# Patient Record
Sex: FEMALE | Race: BLACK OR AFRICAN AMERICAN | NOT HISPANIC OR LATINO
[De-identification: names, ages, dates, MRNs, and addresses within clinical notes are randomized per-mention and may not be internally consistent; named-entity substitution may affect disease eponyms.]

---

## 2018-01-23 ENCOUNTER — RESULT REVIEW (OUTPATIENT)
Age: 43
End: 2018-01-23

## 2018-04-24 ENCOUNTER — APPOINTMENT (OUTPATIENT)
Dept: MRI IMAGING | Facility: CLINIC | Age: 43
End: 2018-04-24

## 2018-05-31 ENCOUNTER — TRANSCRIPTION ENCOUNTER (OUTPATIENT)
Age: 43
End: 2018-05-31

## 2018-06-12 ENCOUNTER — APPOINTMENT (OUTPATIENT)
Dept: MRI IMAGING | Facility: CLINIC | Age: 43
End: 2018-06-12

## 2018-06-24 ENCOUNTER — TRANSCRIPTION ENCOUNTER (OUTPATIENT)
Age: 43
End: 2018-06-24

## 2018-07-15 ENCOUNTER — EMERGENCY (EMERGENCY)
Facility: HOSPITAL | Age: 43
LOS: 1 days | Discharge: HOME | End: 2018-07-15
Attending: EMERGENCY MEDICINE | Admitting: EMERGENCY MEDICINE
Payer: MEDICAID

## 2018-07-15 VITALS
SYSTOLIC BLOOD PRESSURE: 138 MMHG | DIASTOLIC BLOOD PRESSURE: 88 MMHG | OXYGEN SATURATION: 100 % | HEART RATE: 99 BPM | TEMPERATURE: 99 F | RESPIRATION RATE: 17 BRPM

## 2018-07-15 VITALS
OXYGEN SATURATION: 98 % | DIASTOLIC BLOOD PRESSURE: 65 MMHG | HEART RATE: 82 BPM | SYSTOLIC BLOOD PRESSURE: 124 MMHG | RESPIRATION RATE: 16 BRPM

## 2018-07-15 PROCEDURE — 93010 ELECTROCARDIOGRAM REPORT: CPT

## 2018-07-15 PROCEDURE — 71046 X-RAY EXAM CHEST 2 VIEWS: CPT | Mod: 26

## 2018-07-15 PROCEDURE — 99284 EMERGENCY DEPT VISIT MOD MDM: CPT | Mod: 25

## 2018-07-15 NOTE — ED PROVIDER NOTE - GENITOURINARY, MLM
No discharge, lesions. Chaperone: Noemi Pardo RN No discharge, lesions. Chaperone: Noemi Pardo RN no swelling.

## 2018-07-15 NOTE — ED PROVIDER NOTE - MEDICAL DECISION MAKING DETAILS
43F presenting with intermittent sob x1 month with labs and physical with PMD unremarkable. Patient PERC negative. No risk factors for ACS. Patient very well appearing, no focal findings on exam.

## 2018-07-15 NOTE — ED ADULT TRIAGE NOTE - CHIEF COMPLAINT QUOTE
Pt arrives to ED c/o SOB starting a few weeks ago.  Pt reports during the day she will have to take a deep breath to catch her breath.  Pt reports LPM was June 8th and is late for this month.  Pt reports having a yeast infection and taking Monistat a few days ago.  Pt not on birth control, has not been on an extended trip.  Pt not in respiratory distress in triage, able to speak in complete sentences.  Pt denies hx of asthma, does not smoke.  Pt denies CP. Pt arrives to ED c/o SOB starting a few weeks ago.  Pt reports during the day she will have to take a deep breath to catch her breath.  Pt reports LPM was June 8th and is late for this month.  Pt reports having a yeast infection and taking Monistat a few days ago.  Pt not on birth control, has not been on an extended trip.  Pt not in respiratory distress in triage, able to speak in complete sentences.  Pt denies hx of asthma, does not smoke.  Pt denies CP.  Update: 03:15 - Pt reports chest tightness.  EKG initiated in triage.  Charge RN notified.

## 2018-07-15 NOTE — ED PROVIDER NOTE - OBJECTIVE STATEMENT
43F with no pmh presenting with intermittent sob episodes x3-4 weeks. Patient states occasionally with no obvious inciting factor, patient needs to stop and catch her breath. Sometimes it is while talking on the phone, sometimes it is walking, sometimes at rest. Lasts for a few seconds and she is feeling better. States tonight she was going to sleep and felt symptoms arise. Endorses she saw her PMD about 1 month ago near onset of symptoms with full physical and routine labs unremarkable. Patient also complaining of swelling around vaginal area, currently being treated for yeast infection. Also states she may be late for her period which she never is with home pregnancy test negative. Denies fever, chills, cp, n/v/d, dizziness, headache, dysuria, recent illness, travel, sick contacts 43F with no pmh presenting with intermittent sob episodes x3-4 weeks. Patient states occasionally with no obvious inciting factor, patient needs to stop and catch her breath. Sometimes it is while talking on the phone, sometimes it is walking, sometimes at rest. Lasts for a few seconds and she is feeling better. States tonight she was going to sleep and felt symptoms arise. Endorses she saw her PMD about 1 month ago near onset of symptoms with full physical and routine labs unremarkable. Patient also complaining of swelling around vaginal area, currently being treated for yeast infection. Also states she may be late for her period which she never is with home pregnancy test negative. Denies fever, chills, cp, n/v/d, dizziness, headache, dysuria, recent illness, travel, sick contacts.   [place]: 43F no PMH p/w SOB. For ~4w pt has episodes where she feels like she needs to take deep breath, she then takes deep breath and symptoms resolve. Intermittent, several episodes per day. Not worse w/ exertion. Today also had very localized R cp w/ certain positions so came to ED. Denies NVD, lightheaded, diaphoresis, palpitations, cough/rhinorrhea, black/bloody stool, LE pain/swelling, focal weakness/numbness, recent travel/immobilization, abd pain, urinary complaints, f/c. No hormone use. No FMH CAD/clots. No prior cardiac w/u. Currently asymptomatic.

## 2018-07-15 NOTE — ED PROVIDER NOTE - ATTENDING CONTRIBUTION TO CARE
43F no PMH p/w SOB. For ~4w pt has episodes where she feels like she needs to take deep breath, she then takes deep breath and symptoms resolve. Intermittent, several episodes per day. Not worse w/ exertion. Today also had very localized R cp w/ certain positions so came to ED. Borderline tachycardia now self resolved, other vitals wnl, exam as above. EKG grossly wnl. CXR wnl.  ddx: Unclear etiology. clinically not ACS, PE, tamponade, dissection, PTX, perf, myocarditis, mediastinitis.   Comfortable for dc, outpt pmd f/u.  Pt also c/o possible vaginal swelling while in certain positions. ucg neg. Benign pelvic --> outpt gyn f/u.

## 2018-08-02 ENCOUNTER — OUTPATIENT (OUTPATIENT)
Dept: OUTPATIENT SERVICES | Facility: HOSPITAL | Age: 43
LOS: 1 days | End: 2018-08-02
Payer: COMMERCIAL

## 2018-08-02 ENCOUNTER — APPOINTMENT (OUTPATIENT)
Dept: MRI IMAGING | Facility: IMAGING CENTER | Age: 43
End: 2018-08-02
Payer: MEDICAID

## 2018-08-02 DIAGNOSIS — R10.2 PELVIC AND PERINEAL PAIN: ICD-10-CM

## 2018-08-02 PROCEDURE — 72197 MRI PELVIS W/O & W/DYE: CPT

## 2018-08-02 PROCEDURE — A9585: CPT

## 2018-08-02 PROCEDURE — 72197 MRI PELVIS W/O & W/DYE: CPT | Mod: 26

## 2018-09-04 ENCOUNTER — APPOINTMENT (OUTPATIENT)
Dept: INTERVENTIONAL RADIOLOGY/VASCULAR | Facility: CLINIC | Age: 43
End: 2018-09-04

## 2019-10-26 ENCOUNTER — TRANSCRIPTION ENCOUNTER (OUTPATIENT)
Age: 44
End: 2019-10-26

## 2020-02-24 ENCOUNTER — EMERGENCY (EMERGENCY)
Facility: HOSPITAL | Age: 45
LOS: 1 days | Discharge: HOME | End: 2020-02-24
Attending: STUDENT IN AN ORGANIZED HEALTH CARE EDUCATION/TRAINING PROGRAM | Admitting: STUDENT IN AN ORGANIZED HEALTH CARE EDUCATION/TRAINING PROGRAM
Payer: MEDICAID

## 2020-02-24 VITALS
HEART RATE: 92 BPM | DIASTOLIC BLOOD PRESSURE: 83 MMHG | TEMPERATURE: 98 F | RESPIRATION RATE: 16 BRPM | OXYGEN SATURATION: 100 % | SYSTOLIC BLOOD PRESSURE: 130 MMHG

## 2020-02-24 PROCEDURE — 99283 EMERGENCY DEPT VISIT LOW MDM: CPT

## 2020-02-24 NOTE — ED ADULT TRIAGE NOTE - CHIEF COMPLAINT QUOTE
pt c/o R Lower abdominal pain/cramping x1 week. pt finished menstrual period on Sat 2/22, however still having pain which is abnormal for her. pt denies N/V. denies PMH/Medications.

## 2020-02-25 LAB
APPEARANCE UR: CLEAR — SIGNIFICANT CHANGE UP
BACTERIA # UR AUTO: SIGNIFICANT CHANGE UP
BILIRUB UR-MCNC: NEGATIVE — SIGNIFICANT CHANGE UP
BLOOD UR QL VISUAL: NEGATIVE — SIGNIFICANT CHANGE UP
COLOR SPEC: YELLOW — SIGNIFICANT CHANGE UP
GLUCOSE UR-MCNC: NEGATIVE — SIGNIFICANT CHANGE UP
HYALINE CASTS # UR AUTO: NEGATIVE — SIGNIFICANT CHANGE UP
KETONES UR-MCNC: SIGNIFICANT CHANGE UP
LEUKOCYTE ESTERASE UR-ACNC: NEGATIVE — SIGNIFICANT CHANGE UP
NITRITE UR-MCNC: NEGATIVE — SIGNIFICANT CHANGE UP
PH UR: 7 — SIGNIFICANT CHANGE UP (ref 5–8)
PROT UR-MCNC: 30 — SIGNIFICANT CHANGE UP
RBC CASTS # UR COMP ASSIST: SIGNIFICANT CHANGE UP (ref 0–?)
SP GR SPEC: 1.04 — SIGNIFICANT CHANGE UP (ref 1–1.04)
SPECIMEN SOURCE: SIGNIFICANT CHANGE UP
SQUAMOUS # UR AUTO: SIGNIFICANT CHANGE UP
UROBILINOGEN FLD QL: NORMAL — SIGNIFICANT CHANGE UP
WBC UR QL: SIGNIFICANT CHANGE UP (ref 0–?)

## 2020-02-25 RX ORDER — IBUPROFEN 200 MG
600 TABLET ORAL ONCE
Refills: 0 | Status: COMPLETED | OUTPATIENT
Start: 2020-02-25 | End: 2020-02-25

## 2020-02-25 RX ADMIN — Medication 600 MILLIGRAM(S): at 01:16

## 2020-02-25 NOTE — ED PROVIDER NOTE - NSFOLLOWUPINSTRUCTIONS_ED_ALL_ED_FT
Please follow up with your OBGYN. Our clinic information has also been provided below  Continue motrin/advil every 6 hours as needed for pain. You can also take tylenol every 6 hours as needed for pain if not controlled with NSAIDs alone  Return to ED with any new/worsening symptoms, including but not limited to worsening pain, vaginal discharge or foul smells, or anything else concerning to you. Please follow up with your OBGYN. You can also call [phone] to schedule an appointment with our clinic.  Continue motrin/advil every 6 hours as needed for pain. You can also take tylenol every 6 hours as needed for pain if not controlled with NSAIDs alone  Return to ED with any new/worsening symptoms, including but not limited to worsening pain, vaginal discharge or foul smells, or anything else concerning to you.

## 2020-02-25 NOTE — ED ADULT NURSE NOTE - OBJECTIVE STATEMENT
PT si a 45 year old female reporting to the ED for RLQ pain. Pt reports pain started 1 week ago. Pt reports  ending menstrual period on Sat 2/22. Pt reports mild dysuria. Pt is AOX4. Pt denies chest pain or SOB. PT respirations even an unlabored. Pt appears to be comfortable, in NAD. Pt denies fever, chills, n/v/d. Pt denies hematuria. PT urine collected, will continue to monitor.

## 2020-02-25 NOTE — ED PROVIDER NOTE - CLINICAL SUMMARY MEDICAL DECISION MAKING FREE TEXT BOX
45F h/o uterine fibroids p/w mid-lower abd pain without radiation, denying urinary symptoms or vaginal discharge with LMP last week - pain likely related to fibroids, no tenderness on exam, no indicaiton for US at this time - will check UA and UPreg, provide analgesia. Will need OBGYN follow-up

## 2020-02-25 NOTE — ED PROVIDER NOTE - PATIENT PORTAL LINK FT
You can access the FollowMyHealth Patient Portal offered by [facility] by registering at the following website: [email or web address]. By joining [facility]’s FollowMyHealth portal, you will also be able to view your health information using other applications (apps) compatible with our system.

## 2020-02-25 NOTE — ED PROVIDER NOTE - OBJECTIVE STATEMENT
45F h/o uterine fibroids p/w lower abd pain. Pt states had period last week, normal flow and timing, past 2 days has noted crampy abdominal pain. Denies urinary symptoms. No vaginal discharge or pain with intercourse. No back/flank pain. No n/v/d. No fever/chills.

## 2020-02-26 LAB — BACTERIA UR CULT: SIGNIFICANT CHANGE UP

## 2020-02-29 NOTE — ED POST DISCHARGE NOTE - DETAILS
[phone] - Pt c/o lower abdominal pain.  Recommended to follow up with GYN or PMD for repeat UA/UCX.  Return to the ED for any worsening of symptoms.

## 2020-02-29 NOTE — ED POST DISCHARGE NOTE - RESULT SUMMARY
UCX: Culture grew 3 or more types of organisms which indicate collection contamination; consider recollection only if clinically indicated.  Pt was seen for abdominal pain and was not discharged on abx.

## 2020-03-26 ENCOUNTER — APPOINTMENT (OUTPATIENT)
Dept: OBGYN | Facility: HOSPITAL | Age: 45
End: 2020-03-26

## 2020-06-17 ENCOUNTER — RESULT REVIEW (OUTPATIENT)
Age: 45
End: 2020-06-17

## 2021-10-28 ENCOUNTER — RESULT REVIEW (OUTPATIENT)
Age: 46
End: 2021-10-28

## 2021-11-08 PROBLEM — D21.9 BENIGN NEOPLASM OF CONNECTIVE AND OTHER SOFT TISSUE, UNSPECIFIED: Chronic | Status: ACTIVE | Noted: 2020-02-25

## 2022-01-26 ENCOUNTER — APPOINTMENT (OUTPATIENT)
Dept: INTERNAL MEDICINE | Facility: CLINIC | Age: 47
End: 2022-01-26